# Patient Record
Sex: MALE | URBAN - METROPOLITAN AREA
[De-identification: names, ages, dates, MRNs, and addresses within clinical notes are randomized per-mention and may not be internally consistent; named-entity substitution may affect disease eponyms.]

---

## 2019-01-25 ENCOUNTER — OFFICE VISIT (OUTPATIENT)
Dept: URBAN - METROPOLITAN AREA CLINIC 76 | Facility: CLINIC | Age: 44
End: 2019-01-25

## 2019-01-25 PROCEDURE — 92014 COMPRE OPH EXAM EST PT 1/>: CPT | Performed by: OPTOMETRIST

## 2019-01-25 ASSESSMENT — KERATOMETRY
OD: 47.00
OS: 46.75

## 2019-01-25 ASSESSMENT — VISUAL ACUITY
OD: 20/20
OS: 20/20

## 2019-01-25 ASSESSMENT — INTRAOCULAR PRESSURE
OS: 16
OD: 12

## 2019-01-25 NOTE — IMPRESSION/PLAN
Impression: Presbyopia: H52.4. Plan: Discussed condition. New mrx given today. Pt to call with any concerns.

## 2021-07-26 ENCOUNTER — OFFICE VISIT (OUTPATIENT)
Dept: URBAN - METROPOLITAN AREA CLINIC 76 | Facility: CLINIC | Age: 46
End: 2021-07-26
Payer: COMMERCIAL

## 2021-07-26 DIAGNOSIS — H52.4 PRESBYOPIA: Primary | ICD-10-CM

## 2021-07-26 PROCEDURE — 92014 COMPRE OPH EXAM EST PT 1/>: CPT | Performed by: OPTOMETRIST

## 2021-07-26 ASSESSMENT — KERATOMETRY
OS: 47.00
OD: 47.00

## 2021-07-26 ASSESSMENT — VISUAL ACUITY
OS: 20/20
OD: 20/20

## 2021-07-26 ASSESSMENT — INTRAOCULAR PRESSURE
OD: 14
OS: 14